# Patient Record
Sex: MALE | Race: OTHER | HISPANIC OR LATINO | Employment: FULL TIME | URBAN - METROPOLITAN AREA
[De-identification: names, ages, dates, MRNs, and addresses within clinical notes are randomized per-mention and may not be internally consistent; named-entity substitution may affect disease eponyms.]

---

## 2024-09-06 ENCOUNTER — LAB VISIT (OUTPATIENT)
Dept: LAB | Facility: HOSPITAL | Age: 49
End: 2024-09-06
Attending: FAMILY MEDICINE
Payer: COMMERCIAL

## 2024-09-06 ENCOUNTER — OFFICE VISIT (OUTPATIENT)
Dept: INTERNAL MEDICINE | Facility: CLINIC | Age: 49
End: 2024-09-06
Payer: COMMERCIAL

## 2024-09-06 VITALS
OXYGEN SATURATION: 97 % | SYSTOLIC BLOOD PRESSURE: 118 MMHG | RESPIRATION RATE: 16 BRPM | TEMPERATURE: 97 F | BODY MASS INDEX: 30.02 KG/M2 | WEIGHT: 233.94 LBS | HEART RATE: 93 BPM | DIASTOLIC BLOOD PRESSURE: 82 MMHG | HEIGHT: 74 IN

## 2024-09-06 DIAGNOSIS — Z11.3 SCREEN FOR STD (SEXUALLY TRANSMITTED DISEASE): ICD-10-CM

## 2024-09-06 DIAGNOSIS — E11.9 TYPE 2 DIABETES MELLITUS WITHOUT COMPLICATION, WITHOUT LONG-TERM CURRENT USE OF INSULIN: ICD-10-CM

## 2024-09-06 DIAGNOSIS — I25.2 HISTORY OF HEART ATTACK: ICD-10-CM

## 2024-09-06 DIAGNOSIS — Z12.5 SPECIAL SCREENING, PROSTATE CANCER: ICD-10-CM

## 2024-09-06 DIAGNOSIS — E78.5 HYPERLIPIDEMIA, UNSPECIFIED HYPERLIPIDEMIA TYPE: ICD-10-CM

## 2024-09-06 DIAGNOSIS — I10 PRIMARY HYPERTENSION: Primary | Chronic | ICD-10-CM

## 2024-09-06 DIAGNOSIS — E78.2 MIXED HYPERLIPIDEMIA: Chronic | ICD-10-CM

## 2024-09-06 DIAGNOSIS — I10 PRIMARY HYPERTENSION: ICD-10-CM

## 2024-09-06 DIAGNOSIS — E11.9 TYPE 2 DIABETES MELLITUS WITHOUT COMPLICATION, WITHOUT LONG-TERM CURRENT USE OF INSULIN: Chronic | ICD-10-CM

## 2024-09-06 DIAGNOSIS — I25.2 HISTORY OF HEART ATTACK: Chronic | ICD-10-CM

## 2024-09-06 DIAGNOSIS — D72.819 LEUKOPENIA, UNSPECIFIED TYPE: ICD-10-CM

## 2024-09-06 LAB
ABO + RH BLD: NORMAL
ALBUMIN SERPL BCP-MCNC: 4.1 G/DL (ref 3.5–5.2)
ALBUMIN/CREAT UR: NORMAL UG/MG (ref 0–30)
ALP SERPL-CCNC: 80 U/L (ref 55–135)
ALT SERPL W/O P-5'-P-CCNC: 19 U/L (ref 10–44)
ANION GAP SERPL CALC-SCNC: 9 MMOL/L (ref 8–16)
ANISOCYTOSIS BLD QL SMEAR: SLIGHT
AST SERPL-CCNC: 22 U/L (ref 10–40)
BASOPHILS # BLD AUTO: 0.06 K/UL (ref 0–0.2)
BASOPHILS NFR BLD: 2.1 % (ref 0–1.9)
BILIRUB SERPL-MCNC: 0.5 MG/DL (ref 0.1–1)
BUN SERPL-MCNC: 14 MG/DL (ref 6–20)
CALCIUM SERPL-MCNC: 9.5 MG/DL (ref 8.7–10.5)
CHLORIDE SERPL-SCNC: 105 MMOL/L (ref 95–110)
CHOLEST SERPL-MCNC: 221 MG/DL (ref 120–199)
CHOLEST/HDLC SERPL: 4.6 {RATIO} (ref 2–5)
CO2 SERPL-SCNC: 26 MMOL/L (ref 23–29)
COMPLEXED PSA SERPL-MCNC: 0.57 NG/ML (ref 0–4)
CREAT SERPL-MCNC: 0.9 MG/DL (ref 0.5–1.4)
CREAT UR-MCNC: 31 MG/DL (ref 23–375)
DIFFERENTIAL METHOD BLD: ABNORMAL
EOSINOPHIL # BLD AUTO: 0.1 K/UL (ref 0–0.5)
EOSINOPHIL NFR BLD: 3.9 % (ref 0–8)
ERYTHROCYTE [DISTWIDTH] IN BLOOD BY AUTOMATED COUNT: 13 % (ref 11.5–14.5)
EST. GFR  (NO RACE VARIABLE): >60 ML/MIN/1.73 M^2
ESTIMATED AVG GLUCOSE: 111 MG/DL (ref 68–131)
GLUCOSE SERPL-MCNC: 98 MG/DL (ref 70–110)
HBA1C MFR BLD: 5.5 % (ref 4–5.6)
HCT VFR BLD AUTO: 50.9 % (ref 40–54)
HCV AB SERPL QL IA: NORMAL
HDLC SERPL-MCNC: 48 MG/DL (ref 40–75)
HDLC SERPL: 21.7 % (ref 20–50)
HGB BLD-MCNC: 16.5 G/DL (ref 14–18)
HIV 1+2 AB+HIV1 P24 AG SERPL QL IA: NORMAL
HYPOCHROMIA BLD QL SMEAR: ABNORMAL
IMM GRANULOCYTES # BLD AUTO: 0 K/UL (ref 0–0.04)
IMM GRANULOCYTES NFR BLD AUTO: 0 % (ref 0–0.5)
LDLC SERPL CALC-MCNC: 143.6 MG/DL (ref 63–159)
LYMPHOCYTES # BLD AUTO: 1.4 K/UL (ref 1–4.8)
LYMPHOCYTES NFR BLD: 50.7 % (ref 18–48)
MCH RBC QN AUTO: 31.1 PG (ref 27–31)
MCHC RBC AUTO-ENTMCNC: 32.4 G/DL (ref 32–36)
MCV RBC AUTO: 96 FL (ref 82–98)
MICROALBUMIN UR DL<=1MG/L-MCNC: <5 UG/ML
MONOCYTES # BLD AUTO: 0.3 K/UL (ref 0.3–1)
MONOCYTES NFR BLD: 10.7 % (ref 4–15)
NEUTROPHILS # BLD AUTO: 0.9 K/UL (ref 1.8–7.7)
NEUTROPHILS NFR BLD: 32.6 % (ref 38–73)
NONHDLC SERPL-MCNC: 173 MG/DL
NRBC BLD-RTO: 0 /100 WBC
OVALOCYTES BLD QL SMEAR: ABNORMAL
PLATELET # BLD AUTO: 203 K/UL (ref 150–450)
PMV BLD AUTO: 10.8 FL (ref 9.2–12.9)
POIKILOCYTOSIS BLD QL SMEAR: SLIGHT
POLYCHROMASIA BLD QL SMEAR: ABNORMAL
POTASSIUM SERPL-SCNC: 4.2 MMOL/L (ref 3.5–5.1)
PROT SERPL-MCNC: 7.6 G/DL (ref 6–8.4)
RBC # BLD AUTO: 5.31 M/UL (ref 4.6–6.2)
SODIUM SERPL-SCNC: 140 MMOL/L (ref 136–145)
T4 FREE SERPL-MCNC: 1 NG/DL (ref 0.71–1.51)
TESTOST SERPL-MCNC: 634 NG/DL (ref 304–1227)
TRIGL SERPL-MCNC: 147 MG/DL (ref 30–150)
TROPONIN I SERPL DL<=0.01 NG/ML-MCNC: 0.01 NG/ML (ref 0–0.03)
TSH SERPL DL<=0.005 MIU/L-ACNC: 1.38 UIU/ML (ref 0.4–4)
WBC # BLD AUTO: 2.8 K/UL (ref 3.9–12.7)

## 2024-09-06 PROCEDURE — 3066F NEPHROPATHY DOC TX: CPT | Mod: CPTII,S$GLB,, | Performed by: FAMILY MEDICINE

## 2024-09-06 PROCEDURE — 83695 ASSAY OF LIPOPROTEIN(A): CPT | Performed by: FAMILY MEDICINE

## 2024-09-06 PROCEDURE — 36415 COLL VENOUS BLD VENIPUNCTURE: CPT | Mod: PO | Performed by: FAMILY MEDICINE

## 2024-09-06 PROCEDURE — 80053 COMPREHEN METABOLIC PANEL: CPT | Performed by: FAMILY MEDICINE

## 2024-09-06 PROCEDURE — 3074F SYST BP LT 130 MM HG: CPT | Mod: CPTII,S$GLB,, | Performed by: FAMILY MEDICINE

## 2024-09-06 PROCEDURE — 80061 LIPID PANEL: CPT | Performed by: FAMILY MEDICINE

## 2024-09-06 PROCEDURE — 87389 HIV-1 AG W/HIV-1&-2 AB AG IA: CPT | Performed by: FAMILY MEDICINE

## 2024-09-06 PROCEDURE — 99204 OFFICE O/P NEW MOD 45 MIN: CPT | Mod: S$GLB,,, | Performed by: FAMILY MEDICINE

## 2024-09-06 PROCEDURE — 83036 HEMOGLOBIN GLYCOSYLATED A1C: CPT | Performed by: FAMILY MEDICINE

## 2024-09-06 PROCEDURE — 84153 ASSAY OF PSA TOTAL: CPT | Performed by: FAMILY MEDICINE

## 2024-09-06 PROCEDURE — 3008F BODY MASS INDEX DOCD: CPT | Mod: CPTII,S$GLB,, | Performed by: FAMILY MEDICINE

## 2024-09-06 PROCEDURE — 3044F HG A1C LEVEL LT 7.0%: CPT | Mod: CPTII,S$GLB,, | Performed by: FAMILY MEDICINE

## 2024-09-06 PROCEDURE — 86900 BLOOD TYPING SEROLOGIC ABO: CPT | Performed by: FAMILY MEDICINE

## 2024-09-06 PROCEDURE — 85025 COMPLETE CBC W/AUTO DIFF WBC: CPT | Performed by: FAMILY MEDICINE

## 2024-09-06 PROCEDURE — 1159F MED LIST DOCD IN RCRD: CPT | Mod: CPTII,S$GLB,, | Performed by: FAMILY MEDICINE

## 2024-09-06 PROCEDURE — 82570 ASSAY OF URINE CREATININE: CPT | Performed by: FAMILY MEDICINE

## 2024-09-06 PROCEDURE — 84439 ASSAY OF FREE THYROXINE: CPT | Performed by: FAMILY MEDICINE

## 2024-09-06 PROCEDURE — 86901 BLOOD TYPING SEROLOGIC RH(D): CPT | Performed by: FAMILY MEDICINE

## 2024-09-06 PROCEDURE — 3061F NEG MICROALBUMINURIA REV: CPT | Mod: CPTII,S$GLB,, | Performed by: FAMILY MEDICINE

## 2024-09-06 PROCEDURE — 84443 ASSAY THYROID STIM HORMONE: CPT | Performed by: FAMILY MEDICINE

## 2024-09-06 PROCEDURE — 3079F DIAST BP 80-89 MM HG: CPT | Mod: CPTII,S$GLB,, | Performed by: FAMILY MEDICINE

## 2024-09-06 PROCEDURE — 84403 ASSAY OF TOTAL TESTOSTERONE: CPT | Performed by: FAMILY MEDICINE

## 2024-09-06 PROCEDURE — 86803 HEPATITIS C AB TEST: CPT | Performed by: FAMILY MEDICINE

## 2024-09-06 PROCEDURE — 1160F RVW MEDS BY RX/DR IN RCRD: CPT | Mod: CPTII,S$GLB,, | Performed by: FAMILY MEDICINE

## 2024-09-06 PROCEDURE — 99999 PR PBB SHADOW E&M-NEW PATIENT-LVL V: CPT | Mod: PBBFAC,,, | Performed by: FAMILY MEDICINE

## 2024-09-06 PROCEDURE — 84484 ASSAY OF TROPONIN QUANT: CPT | Performed by: FAMILY MEDICINE

## 2024-09-06 PROCEDURE — 82043 UR ALBUMIN QUANTITATIVE: CPT | Performed by: FAMILY MEDICINE

## 2024-09-06 RX ORDER — EMPAGLIFLOZIN 25 MG/1
25 TABLET, FILM COATED ORAL DAILY
COMMUNITY
Start: 2024-08-13

## 2024-09-06 RX ORDER — METFORMIN HYDROCHLORIDE 500 MG/1
500 TABLET ORAL NIGHTLY
COMMUNITY

## 2024-09-06 RX ORDER — CLOPIDOGREL BISULFATE 75 MG/1
75 TABLET ORAL DAILY
COMMUNITY
Start: 2024-05-23

## 2024-09-06 RX ORDER — CETIRIZINE HYDROCHLORIDE 10 MG/1
10 TABLET ORAL DAILY
COMMUNITY

## 2024-09-06 RX ORDER — ROSUVASTATIN CALCIUM 40 MG/1
40 TABLET, COATED ORAL DAILY
COMMUNITY

## 2024-09-06 NOTE — PROGRESS NOTES
Subjective     Patient ID: Weston Morgan is a 48 y.o. male.    Chief Complaint: Establish Care    48 year old male who presents today to [facility]. He reports currently having hypertension, hyperlipidemia, and Type 2 DM. He states he had a mild heart attack last year and is s/p heart cath with minimal findings. He has been placed on conservative therapy with plavix by his cardiologist. 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 Because he missed his 3 month appointment at home he scheduled to be seen today and is requesting a cardiology referral as well. Patient states since the heart attack he is very conscientious about his diet and is physically active. He keeps his three month appointments and self- monitors his A1c and blood pressure at home. He reports some left mid upper back pain that he was originally concerned it was his kidneys but after massaging it improved and he thinks it is just from his work outs.       Review of Systems   Constitutional: Negative.    HENT: Negative.     Eyes: Negative.    Respiratory: Negative.     Cardiovascular: Negative.    Gastrointestinal: Negative.    Musculoskeletal: Negative.    Neurological: Negative.    Psychiatric/Behavioral: Negative.            Objective     Physical Exam  Vitals and nursing note reviewed.   Constitutional:       Appearance: Normal appearance.   HENT:      Head: Normocephalic and atraumatic.      Right Ear: Tympanic membrane, ear canal and external ear normal.      Left Ear: Tympanic membrane, ear canal and external ear normal.      Nose: Nose normal.      Mouth/Throat:      Mouth: Mucous membranes are moist.      Pharynx: Oropharynx is clear.   Eyes:      Extraocular Movements: Extraocular movements intact.      Conjunctiva/sclera: Conjunctivae normal.   Cardiovascular:      Rate and Rhythm: Normal rate and regular rhythm.   Pulmonary:      Effort: Pulmonary effort is normal.      Breath sounds: Normal  breath sounds.   Abdominal:      General: Abdomen is flat. Bowel sounds are normal.      Palpations: Abdomen is soft.   Musculoskeletal:      Cervical back: Normal range of motion and neck supple.      Right lower leg: No edema.      Left lower leg: No edema.   Lymphadenopathy:      Cervical: No cervical adenopathy.   Skin:     General: Skin is warm and dry.   Neurological:      General: No focal deficit present.      Mental Status: He is alert and oriented to person, place, and time.   Psychiatric:         Mood and Affect: Mood normal.         Behavior: Behavior normal.            Assessment and Plan     1. History of heart attack  -     Ambulatory referral/consult to Cardiology; Future; Expected date: 09/13/2024  -     TESTOSTERONE; Future; Expected date: 09/06/2024  -     TROPONIN I; Future; Expected date: 09/06/2024  -     GROUP & RH; Future; Expected date: 09/06/2024  -     LIPOPROTEIN A (LPA); Future; Expected date: 09/06/2024    2. Primary hypertension  -     T4, Free; Future; Expected date: 09/06/2024  -     TSH; Future; Expected date: 09/06/2024  -     Comprehensive Metabolic Panel; Future; Expected date: 09/06/2024  -     CBC Auto Differential; Future; Expected date: 09/06/2024    3. Type 2 diabetes mellitus without complication, without long-term current use of insulin  -     Hemoglobin A1C; Future; Expected date: 09/06/2024  -     Microalbumin/creatinine urine ratio    4. Hyperlipidemia, unspecified hyperlipidemia type  -     Lipid Panel; Future; Expected date: 09/06/2024    5. Screen for STD (sexually transmitted disease)  -     HIV 1/2 Ag/Ab (4th Gen); Future; Expected date: 09/06/2024  -     HEPATITIS C ANTIBODY; Future; Expected date: 09/06/2024    6. Special screening, prostate cancer  -     PSA, SCREENING; Future; Expected date: 09/06/2024                 Follow up in about 3 months (around 12/6/2024).       rosuvastatin  Overview:  stable cont rosuvastatin    Orders:  -     Lipid Panel; Future; Expected date: 09/06/2024    5. Screen for STD (sexually transmitted disease)  -     HIV 1/2 Ag/Ab (4th Gen); Future; Expected date: 09/06/2024  -     HEPATITIS C ANTIBODY; Future; Expected date: 09/06/2024    6. Special screening, prostate cancer  -     PSA, SCREENING; Future; Expected date: 09/06/2024                 Follow up in about 3 months (around 12/6/2024).

## 2024-09-09 LAB — LPA SERPL-MCNC: 5 MG/DL (ref 0–30)

## 2024-09-16 ENCOUNTER — TELEPHONE (OUTPATIENT)
Dept: INTERNAL MEDICINE | Facility: CLINIC | Age: 49
End: 2024-09-16
Payer: COMMERCIAL

## 2024-09-16 PROBLEM — E11.9 TYPE 2 DIABETES MELLITUS WITHOUT COMPLICATION, WITHOUT LONG-TERM CURRENT USE OF INSULIN: Chronic | Status: ACTIVE | Noted: 2024-09-16

## 2024-09-16 PROBLEM — I25.2 HISTORY OF HEART ATTACK: Chronic | Status: ACTIVE | Noted: 2024-09-16

## 2024-09-16 PROBLEM — I10 PRIMARY HYPERTENSION: Chronic | Status: ACTIVE | Noted: 2024-09-16

## 2024-09-16 PROBLEM — E78.2 MIXED HYPERLIPIDEMIA: Chronic | Status: ACTIVE | Noted: 2024-09-16

## 2024-09-20 ENCOUNTER — LAB VISIT (OUTPATIENT)
Dept: LAB | Facility: HOSPITAL | Age: 49
End: 2024-09-20
Attending: FAMILY MEDICINE
Payer: COMMERCIAL

## 2024-09-20 DIAGNOSIS — D72.819 LEUKOPENIA, UNSPECIFIED TYPE: ICD-10-CM

## 2024-09-20 LAB
BASOPHILS # BLD AUTO: 0.05 K/UL (ref 0–0.2)
BASOPHILS NFR BLD: 1.7 % (ref 0–1.9)
CRP SERPL-MCNC: 1.8 MG/L (ref 0–8.2)
DIFFERENTIAL METHOD BLD: ABNORMAL
EOSINOPHIL # BLD AUTO: 0.2 K/UL (ref 0–0.5)
EOSINOPHIL NFR BLD: 5.4 % (ref 0–8)
ERYTHROCYTE [DISTWIDTH] IN BLOOD BY AUTOMATED COUNT: 13.1 % (ref 11.5–14.5)
ERYTHROCYTE [SEDIMENTATION RATE] IN BLOOD BY PHOTOMETRIC METHOD: <2 MM/HR (ref 0–23)
HCT VFR BLD AUTO: 49.1 % (ref 40–54)
HGB BLD-MCNC: 15.8 G/DL (ref 14–18)
IMM GRANULOCYTES # BLD AUTO: 0.01 K/UL (ref 0–0.04)
IMM GRANULOCYTES NFR BLD AUTO: 0.3 % (ref 0–0.5)
LYMPHOCYTES # BLD AUTO: 1.7 K/UL (ref 1–4.8)
LYMPHOCYTES NFR BLD: 56.7 % (ref 18–48)
MCH RBC QN AUTO: 30.9 PG (ref 27–31)
MCHC RBC AUTO-ENTMCNC: 32.2 G/DL (ref 32–36)
MCV RBC AUTO: 96 FL (ref 82–98)
MONOCYTES # BLD AUTO: 0.4 K/UL (ref 0.3–1)
MONOCYTES NFR BLD: 12.8 % (ref 4–15)
NEUTROPHILS # BLD AUTO: 0.7 K/UL (ref 1.8–7.7)
NEUTROPHILS NFR BLD: 23.1 % (ref 38–73)
NRBC BLD-RTO: 0 /100 WBC
PLATELET # BLD AUTO: 204 K/UL (ref 150–450)
PMV BLD AUTO: 10.6 FL (ref 9.2–12.9)
RBC # BLD AUTO: 5.11 M/UL (ref 4.6–6.2)
WBC # BLD AUTO: 2.98 K/UL (ref 3.9–12.7)

## 2024-09-20 PROCEDURE — 86140 C-REACTIVE PROTEIN: CPT | Performed by: FAMILY MEDICINE

## 2024-09-20 PROCEDURE — 85025 COMPLETE CBC W/AUTO DIFF WBC: CPT | Performed by: FAMILY MEDICINE

## 2024-09-20 PROCEDURE — 85652 RBC SED RATE AUTOMATED: CPT | Performed by: FAMILY MEDICINE

## 2024-09-20 PROCEDURE — 36415 COLL VENOUS BLD VENIPUNCTURE: CPT | Mod: PO | Performed by: FAMILY MEDICINE

## 2024-09-25 ENCOUNTER — OFFICE VISIT (OUTPATIENT)
Dept: INTERNAL MEDICINE | Facility: CLINIC | Age: 49
End: 2024-09-25
Payer: COMMERCIAL

## 2024-09-25 DIAGNOSIS — D72.819 LEUKOPENIA, UNSPECIFIED TYPE: Primary | ICD-10-CM

## 2024-09-25 PROCEDURE — 1160F RVW MEDS BY RX/DR IN RCRD: CPT | Mod: CPTII,95,, | Performed by: FAMILY MEDICINE

## 2024-09-25 PROCEDURE — 3061F NEG MICROALBUMINURIA REV: CPT | Mod: CPTII,95,, | Performed by: FAMILY MEDICINE

## 2024-09-25 PROCEDURE — 3044F HG A1C LEVEL LT 7.0%: CPT | Mod: CPTII,95,, | Performed by: FAMILY MEDICINE

## 2024-09-25 PROCEDURE — 1159F MED LIST DOCD IN RCRD: CPT | Mod: CPTII,95,, | Performed by: FAMILY MEDICINE

## 2024-09-25 PROCEDURE — 3066F NEPHROPATHY DOC TX: CPT | Mod: CPTII,95,, | Performed by: FAMILY MEDICINE

## 2024-09-25 PROCEDURE — 99213 OFFICE O/P EST LOW 20 MIN: CPT | Mod: 95,,, | Performed by: FAMILY MEDICINE

## 2024-09-25 NOTE — PROGRESS NOTES
Subjective     Patient ID: Weston Morgan is a 48 y.o. male.        The patient location is: work  The chief complaint leading to consultation is: lab review    Visit type: audiovisual    Face to Face time with patient: 5 min  15 minutes of total time spent on the encounter, which includes face to face time and non-face to face time preparing to see the patient (eg, review of tests), Obtaining and/or reviewing separately obtained history, Documenting clinical information in the electronic or other health record, Independently interpreting results (not separately reported) and communicating results to the patient/family/caregiver, or Care coordination (not separately reported).         Each patient to whom he or she provides medical services by telemedicine is:  (1) informed of the relationship between the physician and patient and the respective role of any other health care provider with respect to management of the patient; and (2) notified that he or she may decline to receive medical services by telemedicine and may withdraw from such care at any time.    Notes:     History of Present Illness      HEMATOLOGIC CONCERNS:  He reports no prior history of low white blood cell count. Initial lab result showed a white blood cell count of 2.8, which was approximately one point below the normal range. A repeat test demonstrated a slight increase, but the count remains low. He has forwarded lab results to his cardiologist and is awaiting a response.    MEDICATIONS:  He inquires about the prescription refill process.      ROS:  General: -fever, -chills, -fatigue, -weight gain, -weight loss  Eyes: -vision changes, -redness, -discharge  ENT: -ear pain, -nasal congestion, -sore throat  Cardiovascular: -chest pain, -palpitations, -lower extremity edema  Respiratory: -cough, -shortness of breath  Gastrointestinal: -abdominal pain, -nausea, -vomiting, -diarrhea, -constipation, -blood in stool  Genitourinary: -dysuria,  -hematuria, -frequency  Musculoskeletal: -joint pain, -muscle pain  Skin: -rash, -lesion  Neurological: -headache, -dizziness, -numbness, -tingling, -weakness  Psychiatric: -anxiety, -depression, -sleep difficulty            Objective     Physical Exam  Constitutional:       Appearance: Normal appearance.   HENT:      Head: Normocephalic and atraumatic.   Eyes:      Extraocular Movements: Extraocular movements intact.   Pulmonary:      Effort: Pulmonary effort is normal.   Neurological:      General: No focal deficit present.      Mental Status: He is alert.   Psychiatric:         Mood and Affect: Mood normal.              Assessment and Plan     1. Leukopenia, unspecified type  -     Ambulatory referral/consult to Hematology / Oncology; Future; Expected date: 10/02/2024        Assessment & Plan    Evaluated low white blood cell count (2.8, slightly increased on repeat but still low)  No prior history of low white blood cell count reported by patient  Considered benign etiology but unable to rule out other causes due to lack of prolonged history  Ruled out medication-induced low white blood cell count based on current medication list  Will refer to hematology for further evaluation and additional testing    PATIENT EDUCATION:   Explained that hematologists are blood specialists who can evaluate various causes of low white blood cell count   Discussed that low white blood cell count can be due to natural variation, other issues, disease states, stressors, illness, or medications   Explained that additional testing may be required by hematology    REFERRALS:   Referred to hematology for evaluation of low white blood cell count    FOLLOW UP:   Follow up after hematology appointment   Contact the office to send medication refill requests              Follow up if symptoms worsen or fail to improve.    This note was generated with the assistance of ambient listening technology. Verbal consent was obtained by the patient  and accompanying visitor(s) for the recording of patient appointment to facilitate this note. I attest to having reviewed and edited the generated note for accuracy, though some syntax or spelling errors may persist. Please contact the author of this note for any clarification.

## 2024-10-07 ENCOUNTER — PATIENT MESSAGE (OUTPATIENT)
Dept: INTERNAL MEDICINE | Facility: CLINIC | Age: 49
End: 2024-10-07
Payer: COMMERCIAL

## 2024-10-07 NOTE — TELEPHONE ENCOUNTER
Pt has a referral to Hemonc but has not been contacted per pt.  Is there something that is preventing him being scheduled?

## 2024-10-08 ENCOUNTER — TELEPHONE (OUTPATIENT)
Dept: HEMATOLOGY/ONCOLOGY | Facility: CLINIC | Age: 49
End: 2024-10-08
Payer: COMMERCIAL

## 2024-10-08 DIAGNOSIS — D72.819 LEUKOPENIA: Primary | ICD-10-CM

## 2024-10-08 NOTE — TELEPHONE ENCOUNTER
Spoke to patient in reference to Hematology referral from Dr. Jacobs. Appointment scheduled per patient's request next available in [facility] or [place]. Appointment notice via pt portal.

## 2024-10-31 ENCOUNTER — LAB VISIT (OUTPATIENT)
Dept: LAB | Facility: HOSPITAL | Age: 49
End: 2024-10-31
Attending: NURSE PRACTITIONER
Payer: COMMERCIAL

## 2024-10-31 DIAGNOSIS — D72.819 LEUKOPENIA: ICD-10-CM

## 2024-10-31 LAB
BASOPHILS # BLD AUTO: 0.07 K/UL (ref 0–0.2)
BASOPHILS NFR BLD: 2.4 % (ref 0–1.9)
DIFFERENTIAL METHOD BLD: ABNORMAL
EOSINOPHIL # BLD AUTO: 0.3 K/UL (ref 0–0.5)
EOSINOPHIL NFR BLD: 8.9 % (ref 0–8)
ERYTHROCYTE [DISTWIDTH] IN BLOOD BY AUTOMATED COUNT: 13.4 % (ref 11.5–14.5)
FOLATE SERPL-MCNC: 17.8 NG/ML (ref 4–24)
GIANT PLATELETS BLD QL SMEAR: PRESENT
HBV SURFACE AG SERPL QL IA: NORMAL
HCT VFR BLD AUTO: 48.5 % (ref 40–54)
HGB BLD-MCNC: 16.2 G/DL (ref 14–18)
IMM GRANULOCYTES # BLD AUTO: 0.01 K/UL (ref 0–0.04)
IMM GRANULOCYTES NFR BLD AUTO: 0.3 % (ref 0–0.5)
LYMPHOCYTES # BLD AUTO: 1.4 K/UL (ref 1–4.8)
LYMPHOCYTES NFR BLD: 47.4 % (ref 18–48)
MCH RBC QN AUTO: 31.2 PG (ref 27–31)
MCHC RBC AUTO-ENTMCNC: 33.4 G/DL (ref 32–36)
MCV RBC AUTO: 93 FL (ref 82–98)
MONOCYTES # BLD AUTO: 0.4 K/UL (ref 0.3–1)
MONOCYTES NFR BLD: 11.9 % (ref 4–15)
NEUTROPHILS # BLD AUTO: 0.9 K/UL (ref 1.8–7.7)
NEUTROPHILS NFR BLD: 29.1 % (ref 38–73)
NRBC BLD-RTO: 0 /100 WBC
PLATELET # BLD AUTO: 209 K/UL (ref 150–450)
PLATELET BLD QL SMEAR: ABNORMAL
PMV BLD AUTO: 10.4 FL (ref 9.2–12.9)
RBC # BLD AUTO: 5.19 M/UL (ref 4.6–6.2)
VIT B12 SERPL-MCNC: 883 PG/ML (ref 210–950)
WBC # BLD AUTO: 2.93 K/UL (ref 3.9–12.7)

## 2024-10-31 PROCEDURE — 82607 VITAMIN B-12: CPT | Performed by: NURSE PRACTITIONER

## 2024-10-31 PROCEDURE — 85025 COMPLETE CBC W/AUTO DIFF WBC: CPT | Performed by: NURSE PRACTITIONER

## 2024-10-31 PROCEDURE — 87340 HEPATITIS B SURFACE AG IA: CPT | Performed by: NURSE PRACTITIONER

## 2024-10-31 PROCEDURE — 86038 ANTINUCLEAR ANTIBODIES: CPT | Performed by: NURSE PRACTITIONER

## 2024-10-31 PROCEDURE — 36415 COLL VENOUS BLD VENIPUNCTURE: CPT | Mod: PN | Performed by: NURSE PRACTITIONER

## 2024-10-31 PROCEDURE — 82746 ASSAY OF FOLIC ACID SERUM: CPT | Performed by: NURSE PRACTITIONER

## 2024-11-01 LAB — ANA SER QL IF: NORMAL

## 2024-11-07 ENCOUNTER — PATIENT MESSAGE (OUTPATIENT)
Dept: HEMATOLOGY/ONCOLOGY | Facility: CLINIC | Age: 49
End: 2024-11-07

## 2024-11-07 ENCOUNTER — OFFICE VISIT (OUTPATIENT)
Dept: HEMATOLOGY/ONCOLOGY | Facility: CLINIC | Age: 49
End: 2024-11-07
Payer: COMMERCIAL

## 2024-11-07 DIAGNOSIS — D70.9 NEUTROPENIA, UNSPECIFIED TYPE: Primary | ICD-10-CM

## 2024-11-07 DIAGNOSIS — D72.819 LEUKOPENIA, UNSPECIFIED TYPE: ICD-10-CM

## 2024-11-07 NOTE — PROGRESS NOTES
The patient location is: home  The chief complaint leading to consultation is: no complaints    Visit type: audiovisual    Face to Face time with patient: 35  60 minutes of total time spent on the encounter, which includes face to face time and non-face to face time preparing to see the patient (eg, review of tests), Obtaining and/or reviewing separately obtained history, Documenting clinical information in the electronic or other health record, Independently interpreting results (not separately reported) and communicating results to the patient/family/caregiver, or Care coordination (not separately reported).     Each patient to whom he or she provides medical services by telemedicine is:  (1) informed of the relationship between the physician and patient and the respective role of any other health care provider with respect to management of the patient; and (2) notified that he or she may decline to receive medical services by telemedicine and may withdraw from such care at any time.      Patient ID: Weston Morgan is a 49 y.o. male.    Chief Complaint: no coplaints    HPI:  50 yo male presents to the clinic as a new patient for further evaluation and recommendation.  Consulted for leukopenia.      Noted outside labs in care everywhere. 6/12/2023 wbc normal low ANC 1.19.  He state on 6/10/2024 [facility] [place] [facility] for what was considered a mild heart attack.  Starting in 9/2024 with noted low WBC and ANC ranging from 0.7-0.9.      Denies frequent infections requiring abx.  Denies f/c/ns. Denies any unintentional weight loss.  He has had intentional weight loss.  Denies any known abnormal lymphadenopathy.    States that he is going through a divorce right now so has increased stress.     States that he feels perfect and works out every night.    Denies illicit drug use or cigarette smoking.  Drinks a 6 pack of beer on Saturdays.    Family hx of cancer:  No known family hx  of cancer    Review of medications without adverse reaction including neutropenia    I have reviewed all of the patient's relevant lab work available in the medical record and have utilized this in my evaluation and management recommendations today.      Social History     Socioeconomic History    Marital status: Legally     Number of children: 2   Tobacco Use    Smoking status: Never     Passive exposure: Never    Smokeless tobacco: Never   Substance and Sexual Activity    Alcohol use: Yes     Comment: Rarely    Drug use: Never    Sexual activity: Yes     Partners: Female     Social Drivers of Health     Financial Resource Strain: Low Risk  (9/25/2024)    Overall Financial Resource Strain (CARDIA)     Difficulty of Paying Living Expenses: Not hard at all   Food Insecurity: Patient Declined (9/25/2024)    Hunger Vital Sign     Worried About Running Out of Food in the Last Year: Patient declined     Ran Out of Food in the Last Year: Patient declined   Transportation Needs: Unknown (6/10/2023)    Received from [facility]    PRAPARE - Transportation     Lack of Transportation (Medical): No   Physical Activity: Unknown (9/25/2024)    Exercise Vital Sign     Days of Exercise per Week: 2 days   Stress: No Stress Concern Present (9/25/2024)    [employer] - Occupational Stress Questionnaire     Feeling of Stress : Only a little   Housing Stability: Unknown (9/25/2024)    Housing Stability Vital Sign     Unable to Pay for Housing in the Last Year: Patient declined       Family History   Problem Relation Name Age of Onset    Heart disease Mother Julianne     Hyperlipidemia Mother Julianne     Hypertension Mother Julianne     Diabetes Father Carlos        No past surgical history on file.    Past Medical History:   Diagnosis Date    Diabetes mellitus, type 2     Heart attack 06/10/2023    Mild    Hyperlipidemia     Hypertension        Review of Systems   Constitutional:   Negative for appetite change and unexpected weight change.   HENT:  Negative for mouth sores.    Eyes:  Negative for visual disturbance.   Respiratory:  Negative for cough and shortness of breath.    Cardiovascular:  Negative for chest pain.   Gastrointestinal:  Negative for abdominal pain and diarrhea.   Genitourinary:  Negative for frequency.   Musculoskeletal:  Negative for back pain.   Skin:  Negative for rash.   Neurological:  Negative for headaches.   Hematological:  Negative for adenopathy.   Psychiatric/Behavioral:  The patient is not nervous/anxious.           Medication List with Changes/Refills   Current Medications    CETIRIZINE (ZYRTEC) 10 MG TABLET    Take 10 mg by mouth once daily.    CLOPIDOGREL (PLAVIX) 75 MG TABLET    Take 75 mg by mouth once daily.    JARDIANCE 25 MG TABLET    Take 25 mg by mouth once daily.    METFORMIN (GLUCOPHAGE) 500 MG TABLET    Take 500 mg by mouth nightly.    ROSUVASTATIN (CRESTOR) 40 MG TAB    Take 40 mg by mouth once daily.        Objective:   There were no vitals filed for this visit.    Physical Exam  Constitutional:       Appearance: Normal appearance.   Pulmonary:      Effort: Pulmonary effort is normal.   Neurological:      Mental Status: He is alert and oriented to person, place, and time.   Psychiatric:         Mood and Affect: Mood normal.         Behavior: Behavior normal.         Thought Content: Thought content normal.         Judgment: Judgment normal.         Assessment:     Problem List Items Addressed This Visit    None  Visit Diagnoses       Neutropenia, unspecified type    -  Primary    Leukopenia, unspecified type                Lab Results   Component Value Date    WBC 2.93 (L) 10/31/2024    RBC 5.19 10/31/2024    HGB 16.2 10/31/2024    HCT 48.5 10/31/2024    MCV 93 10/31/2024    MCH 31.2 (H) 10/31/2024    MCHC 33.4 10/31/2024    RDW 13.4 10/31/2024     10/31/2024    MPV 10.4 10/31/2024    GRAN 0.9 (L) 10/31/2024    GRAN 29.1 (L) 10/31/2024     "LYMPH 1.4 10/31/2024    LYMPH 47.4 10/31/2024    MONO 0.4 10/31/2024    MONO 11.9 10/31/2024    EOS 0.3 10/31/2024    BASO 0.07 10/31/2024    EOSINOPHIL 8.9 (H) 10/31/2024    BASOPHIL 2.4 (H) 10/31/2024      Lab Results   Component Value Date     09/06/2024    K 4.2 09/06/2024     09/06/2024    CO2 26 09/06/2024    BUN 14 09/06/2024    CREATININE 0.9 09/06/2024    CALCIUM 9.5 09/06/2024    ANIONGAP 9 09/06/2024     Lab Results   Component Value Date    ALT 19 09/06/2024    AST 22 09/06/2024    ALKPHOS 80 09/06/2024    BILITOT 0.5 09/06/2024     No results found for: "IRON", "TRANSFERRIN", "TIBC", "FESATURATED", "FERRITIN"     Plan:   Neutropenia, unspecified type    Leukopenia, unspecified type  -     Ambulatory referral/consult to Hematology / Oncology    Workup for leukopenia/neutropenia unremarkable. Will need to evaluate peripheral smear.  Test ordered.   Will reach out to heme path to determine status of evaluation.  Discuss evaluation with bone marrow biopsy vs watching with labs for worsening neutropenia or development of additional cytopenias.    Update: unable to obtain peripheral smear results.  Will continue to trend cbc's over a period of 4 months and will f/u in 4 months to discuss results - possible bone marrow biopsy pending results.     Med Onc Chart Routing      Follow up with physician    Follow up with SHARON 4 months. lab prior - VV after to discuss   Infusion scheduling note   n/a   Injection scheduling note n/a   Labs   Scheduling:  Preferred lab: [facility]  Lab interval:  Standing monthly CBC's x 4 with f/u after the 4th cbc to discuss results   Imaging   N/a   Pharmacy appointment No pharmacy appointment needed      Other referrals       No additional referrals needed  n/a              Collaborating Provider:  Dr. Stu Augustin    Thank You,  Vinh Natarajan, FNP-C  Benign Hematology    "

## 2024-11-07 NOTE — PROGRESS NOTES
Caryl Mullen,  A peripheral smear was ordered on this patient with neutropenia.  I have not received the results.  Can you please check the status?  If this does not pertain to you can you please let me know who I would need to reach out to?    Thank you,   Vinh Natarajan - MARCOS -C  [facility]

## 2025-01-06 ENCOUNTER — LAB VISIT (OUTPATIENT)
Dept: LAB | Facility: HOSPITAL | Age: 50
End: 2025-01-06
Attending: FAMILY MEDICINE
Payer: COMMERCIAL

## 2025-01-06 ENCOUNTER — OFFICE VISIT (OUTPATIENT)
Dept: INTERNAL MEDICINE | Facility: CLINIC | Age: 50
End: 2025-01-06
Payer: COMMERCIAL

## 2025-01-06 VITALS
HEART RATE: 72 BPM | DIASTOLIC BLOOD PRESSURE: 80 MMHG | WEIGHT: 231.06 LBS | BODY MASS INDEX: 29.65 KG/M2 | TEMPERATURE: 96 F | SYSTOLIC BLOOD PRESSURE: 118 MMHG | HEIGHT: 74 IN | OXYGEN SATURATION: 97 %

## 2025-01-06 DIAGNOSIS — I25.2 HISTORY OF HEART ATTACK: Chronic | ICD-10-CM

## 2025-01-06 DIAGNOSIS — I10 PRIMARY HYPERTENSION: ICD-10-CM

## 2025-01-06 DIAGNOSIS — D72.819 LEUKOPENIA, UNSPECIFIED TYPE: ICD-10-CM

## 2025-01-06 DIAGNOSIS — D72.819 LEUKOPENIA, UNSPECIFIED TYPE: Chronic | ICD-10-CM

## 2025-01-06 DIAGNOSIS — Z13.6 ENCOUNTER FOR SCREENING FOR CARDIOVASCULAR DISORDERS: ICD-10-CM

## 2025-01-06 DIAGNOSIS — E11.9 TYPE 2 DIABETES MELLITUS WITHOUT COMPLICATION, WITHOUT LONG-TERM CURRENT USE OF INSULIN: Chronic | ICD-10-CM

## 2025-01-06 DIAGNOSIS — I10 PRIMARY HYPERTENSION: Primary | Chronic | ICD-10-CM

## 2025-01-06 DIAGNOSIS — E11.9 TYPE 2 DIABETES MELLITUS WITHOUT COMPLICATION, WITHOUT LONG-TERM CURRENT USE OF INSULIN: ICD-10-CM

## 2025-01-06 LAB
ALBUMIN SERPL BCP-MCNC: 4.1 G/DL (ref 3.5–5.2)
ALP SERPL-CCNC: 78 U/L (ref 40–150)
ALT SERPL W/O P-5'-P-CCNC: 20 U/L (ref 10–44)
ANION GAP SERPL CALC-SCNC: 10 MMOL/L (ref 8–16)
AST SERPL-CCNC: 27 U/L (ref 10–40)
BASOPHILS # BLD AUTO: ABNORMAL K/UL (ref 0–0.2)
BASOPHILS NFR BLD: 0 % (ref 0–1.9)
BILIRUB SERPL-MCNC: 0.4 MG/DL (ref 0.1–1)
BUN SERPL-MCNC: 17 MG/DL (ref 6–20)
CALCIUM SERPL-MCNC: 9.2 MG/DL (ref 8.7–10.5)
CHLORIDE SERPL-SCNC: 104 MMOL/L (ref 95–110)
CHOLEST SERPL-MCNC: 164 MG/DL (ref 120–199)
CHOLEST/HDLC SERPL: 3.3 {RATIO} (ref 2–5)
CO2 SERPL-SCNC: 26 MMOL/L (ref 23–29)
CREAT SERPL-MCNC: 0.8 MG/DL (ref 0.5–1.4)
DIFFERENTIAL METHOD BLD: ABNORMAL
EOSINOPHIL # BLD AUTO: ABNORMAL K/UL (ref 0–0.5)
EOSINOPHIL NFR BLD: 4 % (ref 0–8)
ERYTHROCYTE [DISTWIDTH] IN BLOOD BY AUTOMATED COUNT: 12.8 % (ref 11.5–14.5)
EST. GFR  (NO RACE VARIABLE): >60 ML/MIN/1.73 M^2
ESTIMATED AVG GLUCOSE: 103 MG/DL (ref 68–131)
GLUCOSE SERPL-MCNC: 80 MG/DL (ref 70–110)
HBA1C MFR BLD: 5.2 % (ref 4–5.6)
HCT VFR BLD AUTO: 44.9 % (ref 40–54)
HDLC SERPL-MCNC: 50 MG/DL (ref 40–75)
HDLC SERPL: 30.5 % (ref 20–50)
HGB BLD-MCNC: 15 G/DL (ref 14–18)
IMM GRANULOCYTES # BLD AUTO: ABNORMAL K/UL (ref 0–0.04)
IMM GRANULOCYTES NFR BLD AUTO: ABNORMAL % (ref 0–0.5)
LDLC SERPL CALC-MCNC: 101.4 MG/DL (ref 63–159)
LYMPHOCYTES # BLD AUTO: ABNORMAL K/UL (ref 1–4.8)
LYMPHOCYTES NFR BLD: 53 % (ref 18–48)
MCH RBC QN AUTO: 31.4 PG (ref 27–31)
MCHC RBC AUTO-ENTMCNC: 33.4 G/DL (ref 32–36)
MCV RBC AUTO: 94 FL (ref 82–98)
MONOCYTES # BLD AUTO: ABNORMAL K/UL (ref 0.3–1)
MONOCYTES NFR BLD: 8 % (ref 4–15)
NEUTROPHILS # BLD AUTO: ABNORMAL K/UL (ref 1.8–7.7)
NEUTROPHILS NFR BLD: 35 % (ref 38–73)
NONHDLC SERPL-MCNC: 114 MG/DL
NRBC BLD-RTO: 0 /100 WBC
PLATELET # BLD AUTO: 190 K/UL (ref 150–450)
PLATELET BLD QL SMEAR: ABNORMAL
PMV BLD AUTO: 10.5 FL (ref 9.2–12.9)
POTASSIUM SERPL-SCNC: 4.5 MMOL/L (ref 3.5–5.1)
PROT SERPL-MCNC: 7.7 G/DL (ref 6–8.4)
RBC # BLD AUTO: 4.77 M/UL (ref 4.6–6.2)
SODIUM SERPL-SCNC: 140 MMOL/L (ref 136–145)
TRIGL SERPL-MCNC: 63 MG/DL (ref 30–150)
WBC # BLD AUTO: 3.42 K/UL (ref 3.9–12.7)

## 2025-01-06 PROCEDURE — 80061 LIPID PANEL: CPT | Performed by: FAMILY MEDICINE

## 2025-01-06 PROCEDURE — 1160F RVW MEDS BY RX/DR IN RCRD: CPT | Mod: CPTII,S$GLB,, | Performed by: FAMILY MEDICINE

## 2025-01-06 PROCEDURE — 99214 OFFICE O/P EST MOD 30 MIN: CPT | Mod: S$GLB,,, | Performed by: FAMILY MEDICINE

## 2025-01-06 PROCEDURE — 3074F SYST BP LT 130 MM HG: CPT | Mod: CPTII,S$GLB,, | Performed by: FAMILY MEDICINE

## 2025-01-06 PROCEDURE — 1159F MED LIST DOCD IN RCRD: CPT | Mod: CPTII,S$GLB,, | Performed by: FAMILY MEDICINE

## 2025-01-06 PROCEDURE — 85027 COMPLETE CBC AUTOMATED: CPT | Performed by: FAMILY MEDICINE

## 2025-01-06 PROCEDURE — 83036 HEMOGLOBIN GLYCOSYLATED A1C: CPT | Performed by: FAMILY MEDICINE

## 2025-01-06 PROCEDURE — 36415 COLL VENOUS BLD VENIPUNCTURE: CPT | Mod: PO | Performed by: FAMILY MEDICINE

## 2025-01-06 PROCEDURE — 84165 PROTEIN E-PHORESIS SERUM: CPT | Mod: 26,,, | Performed by: PATHOLOGY

## 2025-01-06 PROCEDURE — 99999 PR PBB SHADOW E&M-EST. PATIENT-LVL IV: CPT | Mod: PBBFAC,,, | Performed by: FAMILY MEDICINE

## 2025-01-06 PROCEDURE — 3008F BODY MASS INDEX DOCD: CPT | Mod: CPTII,S$GLB,, | Performed by: FAMILY MEDICINE

## 2025-01-06 PROCEDURE — 84165 PROTEIN E-PHORESIS SERUM: CPT | Performed by: FAMILY MEDICINE

## 2025-01-06 PROCEDURE — 85007 BL SMEAR W/DIFF WBC COUNT: CPT | Performed by: FAMILY MEDICINE

## 2025-01-06 PROCEDURE — 80053 COMPREHEN METABOLIC PANEL: CPT | Performed by: FAMILY MEDICINE

## 2025-01-06 PROCEDURE — 3079F DIAST BP 80-89 MM HG: CPT | Mod: CPTII,S$GLB,, | Performed by: FAMILY MEDICINE

## 2025-01-06 RX ORDER — IBUPROFEN 800 MG/1
800 TABLET ORAL 3 TIMES DAILY
COMMUNITY
Start: 2024-11-18 | End: 2025-01-06

## 2025-01-06 NOTE — PROGRESS NOTES
Subjective     Patient ID: Weston Morgan is a 49 y.o. male.    Chief Complaint: Diabetes, Hypertension, and Follow-up (3 months)    History of Present Illness    Weston presents for a follow-up visit to discuss recent lab results and ongoing care with their hematologist, as well as to address recent shoulder and neck pain.    HPI:  Weston reports shoulder pain that started before the holiday season and intensified to a constant level. This led to a visit to an urgent care facility in [place] on a weekend. Initially diagnosed with nerve pain attributed to exercise, a cervical x-ray was performed upon request, revealing bone spurs and misalignment. The urgent care suggested this could be related to sleep position. Weston acquired a neck brace for nocturnal use but after taking two ibuprofen, the pain resolved completely and has not recurred.    Weston is following up with a hematologist for leukopenia. A hip biopsy for bone marrow sampling was proposed, but the patient declined, preferring to delay the procedure. They agreed to monthly labs to monitor for changes. The hematologist indicated that if results remain consistent after three rounds of testing, further investigation would be pursued.    Weston worked through the holiday season. He adhered to a diet and implemented changes prior to his last blood test, expressing interest in the impact on his results.    Weston has been taking Plavix and consulted with his cardiologist about transitioning to baby aspirin once his current supply is exhausted.    Weston denies current shoulder or neck pain.    MEDICATIONS:  Weston is on Plavix for platelets, which will be replaced with baby aspirin when the supply runs out. He is to start baby aspirin to replace Plavix for platelets when Plavix is no longer available.    MEDICAL HISTORY:  Weston has a history of diabetes and low white blood cell count.    TEST RESULTS:  Weston has undergone several labs in the past. These include a CBC  (Complete Blood Count) ordered by a hematologist, A1C, calcium, lipoprotein A, and a lipid panel for cholesterol. He also had a comprehensive metabolic panel, which included measurements of sodium, potassium, kidney function, and liver function.    IMAGING:  Weston had an X-ray of the neck before Christmas. The results revealed bone spurs and misalignment, possibly due to his sleep position.      ROS:  General: -fever, -chills, -fatigue, -weight gain, -weight loss  Eyes: -vision changes, -redness, -discharge  ENT: -ear pain, -nasal congestion, -sore throat  Cardiovascular: -chest pain, -palpitations, -lower extremity edema  Respiratory: -cough, -shortness of breath  Gastrointestinal: -abdominal pain, -nausea, -vomiting, -diarrhea, -constipation, -blood in stool  Genitourinary: -dysuria, -hematuria, -frequency  Musculoskeletal: -joint pain, -muscle pain, -joint stiffness  Skin: -rash, -lesion  Neurological: -headache, -dizziness, -numbness, -tingling  Psychiatric: -anxiety, -depression, -sleep difficulty            Objective     Physical Exam  Vitals and nursing note reviewed.   Constitutional:       Appearance: Normal appearance.   HENT:      Head: Normocephalic and atraumatic.   Eyes:      Extraocular Movements: Extraocular movements intact.      Conjunctiva/sclera: Conjunctivae normal.   Cardiovascular:      Rate and Rhythm: Normal rate and regular rhythm.   Pulmonary:      Effort: Pulmonary effort is normal.      Breath sounds: Normal breath sounds.   Musculoskeletal:         General: Normal range of motion.      Cervical back: Normal range of motion and neck supple.      Right lower leg: No edema.      Left lower leg: No edema.   Skin:     General: Skin is warm and dry.   Neurological:      General: No focal deficit present.      Mental Status: He is alert and oriented to person, place, and time.   Psychiatric:         Mood and Affect: Mood normal.         Behavior: Behavior normal.                Assessment and  Plan     1. Primary hypertension  Overview:  stable cont diet cotnrol    Orders:  -     CBC W/ AUTO DIFFERENTIAL; Future; Expected date: 01/06/2025  -     COMPREHENSIVE METABOLIC PANEL; Future; Expected date: 01/06/2025    2. Encounter for screening for cardiovascular disorders  -     CT Cardiac Scoring; Future; Expected date: 01/06/2025  -     LIPID PANEL; Future; Expected date: 01/06/2025    3. Leukopenia, unspecified type  -     PROTEIN ELECTROPHORESIS, SERUM; Future; Expected date: 01/06/2025    4. History of heart attack  Overview:  stable cont rosuvaastatin and plavix      5. Type 2 diabetes mellitus without complication, without long-term current use of insulin  Overview:  stable cont jardiance and metformin    Orders:  -     HEMOGLOBIN A1C; Future; Expected date: 01/06/2025        Assessment & Plan    Evaluated ongoing hematology concerns, noting hematologist's plan for bone marrow biopsy if labs remains unchanged after 3 months  Assessed recent episode of shoulder/neck pain, likely due to arthritic changes and muscle tightness  Reviewed cardiac risk factors and ordered calcium scoring test for further evaluation  Considered diabetes management and performed diabetic foot exam    PATIENT EDUCATION:   Explained that bone spurs and cervical spine changes are common and not necessarily concerning   Discussed importance of maintaining neck flexibility through movement rather than using a neck brace for cervical spine issues    ACTION ITEMS/LIFESTYLE:   Weston to continue current diet changes   Recommend maintaining physical activity to preserve neck flexibility    MEDICATIONS:   Continued baby aspirin as recommended by cardiologist to replace Plavix after therapy is completed   Continued allergy medication as needed seasonally    ORDERS:   CBC, lipid panel, comprehensive metabolic panel, and protein electrophoresis ordered   CT cardiac calcium scoring ordered   Diabetic foot exam performed    REFERRALS:   Will  provide orthopedic referral if shoulder/neck pain recurs    FOLLOW UP:   Contact office if shoulder/neck pain worsens   Follow up to schedule eye exam (previous exam  in December)              Follow up in about 3 months (around 2025).    This note was generated with the assistance of ambient listening technology. Verbal consent was obtained by the patient and accompanying visitor(s) for the recording of patient appointment to facilitate this note. I attest to having reviewed and edited the generated note for accuracy, though some syntax or spelling errors may persist. Please contact the author of this note for any clarification.

## 2025-01-07 LAB
ALBUMIN SERPL ELPH-MCNC: 4.26 G/DL (ref 3.35–5.55)
ALPHA1 GLOB SERPL ELPH-MCNC: 0.2 G/DL (ref 0.17–0.41)
ALPHA2 GLOB SERPL ELPH-MCNC: 0.64 G/DL (ref 0.43–0.99)
B-GLOBULIN SERPL ELPH-MCNC: 0.75 G/DL (ref 0.5–1.1)
GAMMA GLOB SERPL ELPH-MCNC: 1.16 G/DL (ref 0.67–1.58)
PROT SERPL-MCNC: 7 G/DL (ref 6–8.4)

## 2025-01-08 LAB — PATHOLOGIST INTERPRETATION SPE: NORMAL

## 2025-02-13 ENCOUNTER — LAB VISIT (OUTPATIENT)
Dept: LAB | Facility: HOSPITAL | Age: 50
End: 2025-02-13
Attending: FAMILY MEDICINE
Payer: COMMERCIAL

## 2025-02-13 DIAGNOSIS — D70.9 NEUTROPENIA, UNSPECIFIED TYPE: ICD-10-CM

## 2025-02-13 DIAGNOSIS — D72.819 LEUKOPENIA, UNSPECIFIED TYPE: ICD-10-CM

## 2025-02-13 LAB
BASOPHILS # BLD AUTO: 0.06 K/UL (ref 0–0.2)
BASOPHILS NFR BLD: 1.7 % (ref 0–1.9)
DIFFERENTIAL METHOD BLD: ABNORMAL
EOSINOPHIL # BLD AUTO: 0.2 K/UL (ref 0–0.5)
EOSINOPHIL NFR BLD: 5.8 % (ref 0–8)
ERYTHROCYTE [DISTWIDTH] IN BLOOD BY AUTOMATED COUNT: 12.8 % (ref 11.5–14.5)
HCT VFR BLD AUTO: 46 % (ref 40–54)
HGB BLD-MCNC: 15.4 G/DL (ref 14–18)
IMM GRANULOCYTES # BLD AUTO: 0 K/UL (ref 0–0.04)
IMM GRANULOCYTES NFR BLD AUTO: 0 % (ref 0–0.5)
LYMPHOCYTES # BLD AUTO: 2.1 K/UL (ref 1–4.8)
LYMPHOCYTES NFR BLD: 59.1 % (ref 18–48)
MCH RBC QN AUTO: 32 PG (ref 27–31)
MCHC RBC AUTO-ENTMCNC: 33.5 G/DL (ref 32–36)
MCV RBC AUTO: 96 FL (ref 82–98)
MONOCYTES # BLD AUTO: 0.4 K/UL (ref 0.3–1)
MONOCYTES NFR BLD: 10.6 % (ref 4–15)
NEUTROPHILS # BLD AUTO: 0.8 K/UL (ref 1.8–7.7)
NEUTROPHILS NFR BLD: 22.8 % (ref 38–73)
NRBC BLD-RTO: 0 /100 WBC
PLATELET # BLD AUTO: 182 K/UL (ref 150–450)
PMV BLD AUTO: 10.4 FL (ref 9.2–12.9)
RBC # BLD AUTO: 4.81 M/UL (ref 4.6–6.2)
WBC # BLD AUTO: 3.59 K/UL (ref 3.9–12.7)

## 2025-02-13 PROCEDURE — 36415 COLL VENOUS BLD VENIPUNCTURE: CPT | Mod: PO | Performed by: NURSE PRACTITIONER

## 2025-02-13 PROCEDURE — 85025 COMPLETE CBC W/AUTO DIFF WBC: CPT | Performed by: NURSE PRACTITIONER

## 2025-03-12 ENCOUNTER — LAB VISIT (OUTPATIENT)
Dept: LAB | Facility: HOSPITAL | Age: 50
End: 2025-03-12
Attending: NURSE PRACTITIONER

## 2025-03-12 DIAGNOSIS — D70.9 NEUTROPENIA, UNSPECIFIED TYPE: ICD-10-CM

## 2025-03-12 DIAGNOSIS — D72.819 LEUKOPENIA, UNSPECIFIED TYPE: ICD-10-CM

## 2025-03-12 LAB
BASOPHILS # BLD AUTO: 0.07 K/UL (ref 0–0.2)
BASOPHILS NFR BLD: 1.9 % (ref 0–1.9)
DIFFERENTIAL METHOD BLD: ABNORMAL
EOSINOPHIL # BLD AUTO: 0.2 K/UL (ref 0–0.5)
EOSINOPHIL NFR BLD: 6 % (ref 0–8)
ERYTHROCYTE [DISTWIDTH] IN BLOOD BY AUTOMATED COUNT: 12.8 % (ref 11.5–14.5)
HCT VFR BLD AUTO: 44.9 % (ref 40–54)
HGB BLD-MCNC: 15.1 G/DL (ref 14–18)
IMM GRANULOCYTES # BLD AUTO: 0.01 K/UL (ref 0–0.04)
IMM GRANULOCYTES NFR BLD AUTO: 0.3 % (ref 0–0.5)
LYMPHOCYTES # BLD AUTO: 2.1 K/UL (ref 1–4.8)
LYMPHOCYTES NFR BLD: 57.4 % (ref 18–48)
MCH RBC QN AUTO: 31.9 PG (ref 27–31)
MCHC RBC AUTO-ENTMCNC: 33.6 G/DL (ref 32–36)
MCV RBC AUTO: 95 FL (ref 82–98)
MONOCYTES # BLD AUTO: 0.4 K/UL (ref 0.3–1)
MONOCYTES NFR BLD: 9.9 % (ref 4–15)
NEUTROPHILS # BLD AUTO: 0.9 K/UL (ref 1.8–7.7)
NEUTROPHILS NFR BLD: 24.5 % (ref 38–73)
NRBC BLD-RTO: 0 /100 WBC
PLATELET # BLD AUTO: 197 K/UL (ref 150–450)
PMV BLD AUTO: 10.3 FL (ref 9.2–12.9)
RBC # BLD AUTO: 4.74 M/UL (ref 4.6–6.2)
WBC # BLD AUTO: 3.64 K/UL (ref 3.9–12.7)

## 2025-03-12 PROCEDURE — 85025 COMPLETE CBC W/AUTO DIFF WBC: CPT | Performed by: NURSE PRACTITIONER

## 2025-03-12 PROCEDURE — 36415 COLL VENOUS BLD VENIPUNCTURE: CPT | Mod: PO | Performed by: NURSE PRACTITIONER

## 2025-03-13 ENCOUNTER — OFFICE VISIT (OUTPATIENT)
Dept: HEMATOLOGY/ONCOLOGY | Facility: CLINIC | Age: 50
End: 2025-03-13

## 2025-03-13 VITALS
BODY MASS INDEX: 28.07 KG/M2 | SYSTOLIC BLOOD PRESSURE: 121 MMHG | HEART RATE: 91 BPM | WEIGHT: 218.69 LBS | DIASTOLIC BLOOD PRESSURE: 80 MMHG | HEIGHT: 74 IN

## 2025-03-13 DIAGNOSIS — D70.9 NEUTROPENIA, UNSPECIFIED TYPE: Primary | ICD-10-CM

## 2025-03-13 PROCEDURE — 99215 OFFICE O/P EST HI 40 MIN: CPT | Mod: S$PBB,,, | Performed by: NURSE PRACTITIONER

## 2025-03-13 PROCEDURE — G2211 COMPLEX E/M VISIT ADD ON: HCPCS | Mod: S$PBB,,, | Performed by: NURSE PRACTITIONER

## 2025-03-13 PROCEDURE — 99213 OFFICE O/P EST LOW 20 MIN: CPT | Mod: PBBFAC,PO | Performed by: NURSE PRACTITIONER

## 2025-03-13 PROCEDURE — 99999 PR PBB SHADOW E&M-EST. PATIENT-LVL III: CPT | Mod: PBBFAC,,, | Performed by: NURSE PRACTITIONER

## 2025-03-13 NOTE — PROGRESS NOTES
The patient location is: home  The chief complaint leading to consultation is: no complaints    Visit type: audiovisual    Face to Face time with patient: 35  60 minutes of total time spent on the encounter, which includes face to face time and non-face to face time preparing to see the patient (eg, review of tests), Obtaining and/or reviewing separately obtained history, Documenting clinical information in the electronic or other health record, Independently interpreting results (not separately reported) and communicating results to the patient/family/caregiver, or Care coordination (not separately reported).     Each patient to whom he or she provides medical services by telemedicine is:  (1) informed of the relationship between the physician and patient and the respective role of any other health care provider with respect to management of the patient; and (2) notified that he or she may decline to receive medical services by telemedicine and may withdraw from such care at any time.      Patient ID: Weston Morgan is a 49 y.o. male.    Chief Complaint: no complaints    HPI:  50 yo male presents to the clinic as a new patient for further evaluation and recommendation.  Consulted for leukopenia.      Noted outside labs in care everywhere. 6/12/2023 wbc normal low ANC 1.19.  He state on 6/10/2024 was admitted to [facility] in the [place] area for what was considered a mild heart attack.  Starting in 9/2024 with noted low WBC and ANC ranging from 0.7-0.9.      Denies frequent infections requiring abx.  Denies f/c/ns. Denies any unintentional weight loss.  He has had intentional weight loss.  Denies any known abnormal lymphadenopathy.    States that he is going through a divorce right now so has increased stress.     States that he feels perfect and works out every night.    Denies illicit drug use or cigarette smoking.  Drinks a 6 pack of beer on Saturdays.    Family hx of cancer:  No known family hx  of cancer    Review of medications without adverse reaction including neutropenia    Interval History:  3/13/2025 Continued leukopenia with neutropenia wbc 3.64 (3.59), anc 0.9 (0.8).  No frequent infections with need for abx use. Has no complaints today and states that he is feeling good.     I have reviewed all of the patient's relevant lab work available in the medical record and have utilized this in my evaluation and management recommendations today.      Social History     Socioeconomic History    Marital status: Legally     Number of children: 2   Tobacco Use    Smoking status: Never     Passive exposure: Never    Smokeless tobacco: Never   Substance and Sexual Activity    Alcohol use: Yes     Alcohol/week: 6.0 standard drinks of alcohol     Types: 6 Cans of beer per week    Drug use: Never    Sexual activity: Yes     Partners: Female     Birth control/protection: None     Social Drivers of Health     Financial Resource Strain: Low Risk  (9/25/2024)    Overall Financial Resource Strain (CARDIA)     Difficulty of Paying Living Expenses: Not hard at all   Food Insecurity: Patient Declined (9/25/2024)    Hunger Vital Sign     Worried About Running Out of Food in the Last Year: Patient declined     Ran Out of Food in the Last Year: Patient declined   Transportation Needs: Unknown (6/10/2023)    Received from [facility]    PRAPARE - Transportation     Lack of Transportation (Medical): No   Physical Activity: Unknown (9/25/2024)    Exercise Vital Sign     Days of Exercise per Week: 2 days   Stress: No Stress Concern Present (9/25/2024)    [employer] [place] [employer] - Occupational Stress Questionnaire     Feeling of Stress : Only a little   Housing Stability: Unknown (9/25/2024)    Housing Stability Vital Sign     Unable to Pay for Housing in the Last Year: Patient declined       Family History   Problem Relation Name Age of Onset    Heart disease Mother Julianne      Hyperlipidemia Mother Julianne     Hypertension Mother Julianne     Diabetes Father Carlos        History reviewed. No pertinent surgical history.    Past Medical History:   Diagnosis Date    Diabetes mellitus, type 2     Heart attack 06/10/2023    Mild    Hyperlipidemia     Hypertension        Review of Systems   Constitutional:  Negative for appetite change and unexpected weight change.   HENT:  Negative for mouth sores.    Eyes:  Negative for visual disturbance.   Respiratory:  Negative for cough and shortness of breath.    Cardiovascular:  Negative for chest pain.   Gastrointestinal:  Negative for abdominal pain and diarrhea.   Genitourinary:  Negative for frequency.   Musculoskeletal:  Negative for back pain.   Skin:  Negative for rash.   Neurological:  Negative for headaches.   Hematological:  Negative for adenopathy.   Psychiatric/Behavioral:  The patient is not nervous/anxious.           Medication List with Changes/Refills   Current Medications    CETIRIZINE (ZYRTEC) 10 MG TABLET    Take 10 mg by mouth once daily.    CLOPIDOGREL (PLAVIX) 75 MG TABLET    Take 75 mg by mouth once daily.    JARDIANCE 25 MG TABLET    Take 25 mg by mouth once daily.    METFORMIN (GLUCOPHAGE) 500 MG TABLET    Take 500 mg by mouth nightly.    ROSUVASTATIN (CRESTOR) 40 MG TAB    Take 40 mg by mouth once daily.        Objective:     Vitals:    03/13/25 1342   BP: 121/80   Pulse: 91       Physical Exam  Constitutional:       Appearance: Normal appearance.   Pulmonary:      Effort: Pulmonary effort is normal.   Neurological:      Mental Status: He is alert and oriented to person, place, and time.   Psychiatric:         Mood and Affect: Mood normal.         Behavior: Behavior normal.         Thought Content: Thought content normal.         Judgment: Judgment normal.         Assessment:     Problem List Items Addressed This Visit    None  Visit Diagnoses         Neutropenia, unspecified type    -  Primary              Lab Results  "  Component Value Date    WBC 3.64 (L) 03/12/2025    RBC 4.74 03/12/2025    HGB 15.1 03/12/2025    HCT 44.9 03/12/2025    MCV 95 03/12/2025    MCH 31.9 (H) 03/12/2025    MCHC 33.6 03/12/2025    RDW 12.8 03/12/2025     03/12/2025    MPV 10.3 03/12/2025    GRAN 0.9 (L) 03/12/2025    GRAN 24.5 (L) 03/12/2025    LYMPH 2.1 03/12/2025    LYMPH 57.4 (H) 03/12/2025    MONO 0.4 03/12/2025    MONO 9.9 03/12/2025    EOS 0.2 03/12/2025    BASO 0.07 03/12/2025    EOSINOPHIL 6.0 03/12/2025    BASOPHIL 1.9 03/12/2025      Lab Results   Component Value Date     01/06/2025    K 4.5 01/06/2025     01/06/2025    CO2 26 01/06/2025    BUN 17 01/06/2025    CREATININE 0.8 01/06/2025    CALCIUM 9.2 01/06/2025    ANIONGAP 10 01/06/2025     Lab Results   Component Value Date    ALT 20 01/06/2025    AST 27 01/06/2025    ALKPHOS 78 01/06/2025    BILITOT 0.4 01/06/2025     No results found for: "IRON", "TRANSFERRIN", "TIBC", "FESATURATED", "FERRITIN"   Lab Results   Component Value Date    HEPCAB Non-reactive 09/06/2024     Lab Results   Component Value Date    [id number] Non-reactive 09/06/2024     No results found for: "ALBER" negtive    Plan:   Neutropenia, unspecified type  -     Cancel: CMV DNA, Quantitative, PCR; Future  -     Cancel: EB VIRUS DNA DETECTOR, QUALITATIVE; Future; Expected date: 03/13/2025  -     Cancel: Type & Screen; Future; Expected date: 03/13/2025        Workup for leukopenia/neutropenia unremarkable.  Discuss evaluation with bone marrow biopsy vs watching with labs for worsening neutropenia or development of additional cytopenias.  Will continue to monitor with labs every 6 months.  He knows to reach out to me if he starts to develop frequent infections in the meantime that require abx use.       Med Onc Chart Routing      Follow up with physician    Follow up with SHARON 6 months. VV w/ labs prior   Infusion scheduling note   n/a   Injection scheduling note n/a   Labs   Scheduling:  Preferred lab: [facility]  Lab interval:  cbc prior to next visit   Imaging   N/a   Pharmacy appointment No pharmacy appointment needed      Other referrals       No additional referrals needed  n/a          Total time spent on encounter: 45 minutes    Collaborating Provider:  Dr. Stu Augustin    Thank You,  Vinh Natarajan, [email or web address]  Benign Hematology

## 2025-05-21 ENCOUNTER — OFFICE VISIT (OUTPATIENT)
Dept: INTERNAL MEDICINE | Facility: CLINIC | Age: 50
End: 2025-05-21
Payer: COMMERCIAL

## 2025-05-21 ENCOUNTER — LAB VISIT (OUTPATIENT)
Dept: LAB | Facility: HOSPITAL | Age: 50
End: 2025-05-21
Attending: FAMILY MEDICINE
Payer: COMMERCIAL

## 2025-05-21 VITALS
HEIGHT: 74 IN | OXYGEN SATURATION: 99 % | HEART RATE: 71 BPM | BODY MASS INDEX: 28.21 KG/M2 | DIASTOLIC BLOOD PRESSURE: 78 MMHG | TEMPERATURE: 97 F | WEIGHT: 219.81 LBS | SYSTOLIC BLOOD PRESSURE: 122 MMHG

## 2025-05-21 DIAGNOSIS — D72.819 LEUKOPENIA, UNSPECIFIED TYPE: ICD-10-CM

## 2025-05-21 DIAGNOSIS — I10 PRIMARY HYPERTENSION: ICD-10-CM

## 2025-05-21 DIAGNOSIS — Z00.00 ROUTINE GENERAL MEDICAL EXAMINATION AT A HEALTH CARE FACILITY: ICD-10-CM

## 2025-05-21 DIAGNOSIS — E11.9 TYPE 2 DIABETES MELLITUS WITHOUT COMPLICATION, WITHOUT LONG-TERM CURRENT USE OF INSULIN: ICD-10-CM

## 2025-05-21 DIAGNOSIS — I25.2 HISTORY OF HEART ATTACK: ICD-10-CM

## 2025-05-21 DIAGNOSIS — J06.9 UPPER RESPIRATORY TRACT INFECTION, UNSPECIFIED TYPE: ICD-10-CM

## 2025-05-21 DIAGNOSIS — Z12.5 SPECIAL SCREENING FOR MALIGNANT NEOPLASM OF PROSTATE: ICD-10-CM

## 2025-05-21 DIAGNOSIS — J06.9 UPPER RESPIRATORY TRACT INFECTION, UNSPECIFIED TYPE: Primary | ICD-10-CM

## 2025-05-21 DIAGNOSIS — Z00.00 ROUTINE GENERAL MEDICAL EXAMINATION AT A HEALTH CARE FACILITY: Primary | ICD-10-CM

## 2025-05-21 LAB
ABSOLUTE EOSINOPHIL (OHS): 0.17 K/UL
ABSOLUTE MONOCYTE (OHS): 0.62 K/UL (ref 0.3–1)
ABSOLUTE NEUTROPHIL COUNT (OHS): 2.93 K/UL (ref 1.8–7.7)
ALBUMIN SERPL BCP-MCNC: 4 G/DL (ref 3.5–5.2)
ALBUMIN/CREAT UR: NORMAL
ALP SERPL-CCNC: 80 UNIT/L (ref 40–150)
ALT SERPL W/O P-5'-P-CCNC: 13 UNIT/L (ref 10–44)
ANION GAP (OHS): 14 MMOL/L (ref 8–16)
AST SERPL-CCNC: 16 UNIT/L (ref 11–45)
BASOPHILS # BLD AUTO: 0.07 K/UL
BASOPHILS NFR BLD AUTO: 1.3 %
BILIRUB SERPL-MCNC: 0.7 MG/DL (ref 0.1–1)
BUN SERPL-MCNC: 11 MG/DL (ref 6–20)
CALCIUM SERPL-MCNC: 9.7 MG/DL (ref 8.7–10.5)
CHLORIDE SERPL-SCNC: 99 MMOL/L (ref 95–110)
CHOLEST SERPL-MCNC: 192 MG/DL (ref 120–199)
CHOLEST/HDLC SERPL: 3.6 {RATIO} (ref 2–5)
CO2 SERPL-SCNC: 27 MMOL/L (ref 23–29)
CREAT SERPL-MCNC: 0.8 MG/DL (ref 0.5–1.4)
CREAT UR-MCNC: 89 MG/DL (ref 23–375)
EAG (OHS): 103 MG/DL (ref 68–131)
ERYTHROCYTE [DISTWIDTH] IN BLOOD BY AUTOMATED COUNT: 14.1 % (ref 11.5–14.5)
ERYTHROCYTE [SEDIMENTATION RATE] IN BLOOD BY PHOTOMETRIC METHOD: 11 MM/HR
FOLATE SERPL-MCNC: 8.2 NG/ML (ref 4–24)
GFR SERPLBLD CREATININE-BSD FMLA CKD-EPI: >60 ML/MIN/1.73/M2
GLUCOSE SERPL-MCNC: 92 MG/DL (ref 70–110)
HBA1C MFR BLD: 5.2 % (ref 4–5.6)
HCT VFR BLD AUTO: 49.8 % (ref 40–54)
HDLC SERPL-MCNC: 54 MG/DL (ref 40–75)
HDLC SERPL: 28.1 % (ref 20–50)
HGB BLD-MCNC: 15.5 GM/DL (ref 14–18)
IMM GRANULOCYTES # BLD AUTO: 0.01 K/UL (ref 0–0.04)
IMM GRANULOCYTES NFR BLD AUTO: 0.2 % (ref 0–0.5)
IRON SATN MFR SERPL: 12 % (ref 20–50)
IRON SERPL-MCNC: 43 UG/DL (ref 45–160)
LDLC SERPL CALC-MCNC: 116.2 MG/DL (ref 63–159)
LYMPHOCYTES # BLD AUTO: 1.71 K/UL (ref 1–4.8)
MCH RBC QN AUTO: 31.3 PG (ref 27–31)
MCHC RBC AUTO-ENTMCNC: 31.1 G/DL (ref 32–36)
MCV RBC AUTO: 100 FL (ref 82–98)
MICROALBUMIN UR-MCNC: <5 UG/ML (ref ?–5000)
NONHDLC SERPL-MCNC: 138 MG/DL
NUCLEATED RBC (/100WBC) (OHS): 0 /100 WBC
PLATELET # BLD AUTO: 245 K/UL (ref 150–450)
PMV BLD AUTO: 9.8 FL (ref 9.2–12.9)
POTASSIUM SERPL-SCNC: 4 MMOL/L (ref 3.5–5.1)
PROT SERPL-MCNC: 8 GM/DL (ref 6–8.4)
PSA SERPL-MCNC: 0.68 NG/ML
RBC # BLD AUTO: 4.96 M/UL (ref 4.6–6.2)
RELATIVE EOSINOPHIL (OHS): 3.1 %
RELATIVE LYMPHOCYTE (OHS): 31 % (ref 18–48)
RELATIVE MONOCYTE (OHS): 11.3 % (ref 4–15)
RELATIVE NEUTROPHIL (OHS): 53.1 % (ref 38–73)
SODIUM SERPL-SCNC: 140 MMOL/L (ref 136–145)
T4 FREE SERPL-MCNC: 1.15 NG/DL (ref 0.71–1.51)
TIBC SERPL-MCNC: 363 UG/DL (ref 250–450)
TRANSFERRIN SERPL-MCNC: 245 MG/DL (ref 200–375)
TRIGL SERPL-MCNC: 109 MG/DL (ref 30–150)
TSH SERPL-ACNC: 1.03 UIU/ML (ref 0.4–4)
WBC # BLD AUTO: 5.51 K/UL (ref 3.9–12.7)

## 2025-05-21 PROCEDURE — 99999 PR PBB SHADOW E&M-EST. PATIENT-LVL IV: CPT | Mod: PBBFAC,,, | Performed by: FAMILY MEDICINE

## 2025-05-21 PROCEDURE — 82043 UR ALBUMIN QUANTITATIVE: CPT | Performed by: FAMILY MEDICINE

## 2025-05-21 PROCEDURE — 82746 ASSAY OF FOLIC ACID SERUM: CPT

## 2025-05-21 PROCEDURE — 83036 HEMOGLOBIN GLYCOSYLATED A1C: CPT

## 2025-05-21 PROCEDURE — 80061 LIPID PANEL: CPT

## 2025-05-21 PROCEDURE — 36415 COLL VENOUS BLD VENIPUNCTURE: CPT | Mod: PO

## 2025-05-21 PROCEDURE — 84439 ASSAY OF FREE THYROXINE: CPT

## 2025-05-21 PROCEDURE — 84153 ASSAY OF PSA TOTAL: CPT

## 2025-05-21 PROCEDURE — 83020 HEMOGLOBIN ELECTROPHORESIS: CPT

## 2025-05-21 PROCEDURE — 85025 COMPLETE CBC W/AUTO DIFF WBC: CPT

## 2025-05-21 PROCEDURE — 80053 COMPREHEN METABOLIC PANEL: CPT

## 2025-05-21 PROCEDURE — 84425 ASSAY OF VITAMIN B-1: CPT

## 2025-05-21 PROCEDURE — 86038 ANTINUCLEAR ANTIBODIES: CPT

## 2025-05-21 PROCEDURE — 84443 ASSAY THYROID STIM HORMONE: CPT

## 2025-05-21 PROCEDURE — 83540 ASSAY OF IRON: CPT

## 2025-05-21 PROCEDURE — 85652 RBC SED RATE AUTOMATED: CPT

## 2025-05-21 PROCEDURE — 84207 ASSAY OF VITAMIN B-6: CPT

## 2025-05-21 RX ORDER — ASPIRIN 81 MG/1
81 TABLET ORAL DAILY
COMMUNITY

## 2025-05-22 LAB — ANA (OHS): NORMAL

## 2025-05-23 LAB
HGB A MFR BLD ELPH: 97.4 % (ref 95.8–98)
HGB A2 MFR BLD ELPH: 2.6 % (ref 2–3.3)
HGB F MFR BLD ELPH: 0 % (ref 0–0.9)
HGB FRACT BLD ELPH-IMP: NORMAL
M HPLC HB VARIANT, B: NORMAL

## 2025-05-28 LAB
W VITAMIN B1: 76 UG/L
W VITAMIN B6: 10 UG/L

## 2025-06-05 DIAGNOSIS — I10 PRIMARY HYPERTENSION: Primary | ICD-10-CM

## 2025-06-09 ENCOUNTER — OFFICE VISIT (OUTPATIENT)
Dept: CARDIOLOGY | Facility: CLINIC | Age: 50
End: 2025-06-09
Payer: COMMERCIAL

## 2025-06-09 ENCOUNTER — HOSPITAL ENCOUNTER (OUTPATIENT)
Dept: CARDIOLOGY | Facility: HOSPITAL | Age: 50
Discharge: HOME | End: 2025-06-09
Attending: INTERNAL MEDICINE
Payer: COMMERCIAL

## 2025-06-09 VITALS
OXYGEN SATURATION: 98 % | WEIGHT: 220.25 LBS | SYSTOLIC BLOOD PRESSURE: 130 MMHG | BODY MASS INDEX: 28.28 KG/M2 | DIASTOLIC BLOOD PRESSURE: 80 MMHG | HEART RATE: 58 BPM

## 2025-06-09 DIAGNOSIS — I10 PRIMARY HYPERTENSION: ICD-10-CM

## 2025-06-09 DIAGNOSIS — E78.2 MIXED HYPERLIPIDEMIA: Chronic | ICD-10-CM

## 2025-06-09 DIAGNOSIS — I25.2 HISTORY OF HEART ATTACK: Primary | Chronic | ICD-10-CM

## 2025-06-09 DIAGNOSIS — E11.9 TYPE 2 DIABETES MELLITUS WITHOUT COMPLICATION, WITHOUT LONG-TERM CURRENT USE OF INSULIN: Chronic | ICD-10-CM

## 2025-06-09 LAB
OHS QRS DURATION: 92 MS
OHS QTC CALCULATION: 451 MS

## 2025-06-09 PROCEDURE — 3044F HG A1C LEVEL LT 7.0%: CPT | Mod: CPTII,S$GLB,, | Performed by: INTERNAL MEDICINE

## 2025-06-09 PROCEDURE — 93010 ELECTROCARDIOGRAM REPORT: CPT | Mod: ,,, | Performed by: INTERNAL MEDICINE

## 2025-06-09 PROCEDURE — 99999 PR PBB SHADOW E&M-EST. PATIENT-LVL III: CPT | Mod: PBBFAC,,, | Performed by: INTERNAL MEDICINE

## 2025-06-09 PROCEDURE — 3061F NEG MICROALBUMINURIA REV: CPT | Mod: CPTII,S$GLB,, | Performed by: INTERNAL MEDICINE

## 2025-06-09 PROCEDURE — 1160F RVW MEDS BY RX/DR IN RCRD: CPT | Mod: CPTII,S$GLB,, | Performed by: INTERNAL MEDICINE

## 2025-06-09 PROCEDURE — 3066F NEPHROPATHY DOC TX: CPT | Mod: CPTII,S$GLB,, | Performed by: INTERNAL MEDICINE

## 2025-06-09 PROCEDURE — 3075F SYST BP GE 130 - 139MM HG: CPT | Mod: CPTII,S$GLB,, | Performed by: INTERNAL MEDICINE

## 2025-06-09 PROCEDURE — 1159F MED LIST DOCD IN RCRD: CPT | Mod: CPTII,S$GLB,, | Performed by: INTERNAL MEDICINE

## 2025-06-09 PROCEDURE — 93005 ELECTROCARDIOGRAM TRACING: CPT | Mod: PO

## 2025-06-09 PROCEDURE — 99204 OFFICE O/P NEW MOD 45 MIN: CPT | Mod: S$GLB,,, | Performed by: INTERNAL MEDICINE

## 2025-06-09 PROCEDURE — 3008F BODY MASS INDEX DOCD: CPT | Mod: CPTII,S$GLB,, | Performed by: INTERNAL MEDICINE

## 2025-06-09 PROCEDURE — 3079F DIAST BP 80-89 MM HG: CPT | Mod: CPTII,S$GLB,, | Performed by: INTERNAL MEDICINE

## 2025-06-09 RX ORDER — CLOPIDOGREL BISULFATE 75 MG/1
75 TABLET ORAL DAILY
Qty: 90 TABLET | Refills: 1 | Status: SHIPPED | OUTPATIENT
Start: 2025-06-09 | End: 2025-06-09

## 2025-06-09 RX ORDER — ASPIRIN 81 MG/1
81 TABLET ORAL DAILY
Qty: 90 TABLET | Refills: 1 | Status: SHIPPED | OUTPATIENT
Start: 2025-06-09

## 2025-06-09 NOTE — PROGRESS NOTES
Subjective:   Patient ID:  Weston Morgan is a 49 y.o. male who presents for cardiac consult of No chief complaint on file.      Referral by: Leelee Michelle [place]  [id number] [place] [id number]     Reason for consult: HTN      HPI  The patient came in today for cardiac consult of No chief complaint on file.    6/9/25  Weston Morgan is a 49 y.o. male pt with h/o MI, HTN, HLD, overweight, DM2 here for CVD eval.    BP and HR stable today. BMI 28 - 220 lbs  Pt had an MI in June 2023, saw cardiologist in Dec 2024  He had cath which was neg, is on DAPT .      No results found for this or any previous visit.      No results found for this or any previous visit.      No results found for this or any previous visit.      No cardiac monitor results found for the past 12 months         Past Medical History:   Diagnosis Date    Diabetes mellitus, type 2     Heart attack 06/10/2023    Mild    Hyperlipidemia     Hypertension        History reviewed. No pertinent surgical history.    Social History[1]    Family History   Problem Relation Name Age of Onset    Heart disease Mother Julianne     Hyperlipidemia Mother Julianne     Hypertension Mother Julianne     Diabetes Father Carlos        Patient's Medications   New Prescriptions    No medications on file   Previous Medications    CETIRIZINE (ZYRTEC) 10 MG TABLET    Take 10 mg by mouth once daily.    JARDIANCE 25 MG TABLET    Take 25 mg by mouth once daily.    METFORMIN (GLUCOPHAGE) 500 MG TABLET    Take 500 mg by mouth nightly.    ROSUVASTATIN (CRESTOR) 40 MG TAB    Take 40 mg by mouth once daily.   Modified Medications    Modified Medication Previous Medication    ASPIRIN (ECOTRIN) 81 MG EC TABLET aspirin (ECOTRIN) 81 MG EC tablet       Take 1 tablet (81 mg total) by mouth once daily.    Take 81 mg by mouth once daily.   Discontinued Medications    CLOPIDOGREL (PLAVIX) 75 MG TABLET    Take 75 mg by mouth once daily.       Review of  Systems   Constitutional: Negative.    HENT: Negative.     Eyes: Negative.    Respiratory: Negative.     Cardiovascular: Negative.    Gastrointestinal: Negative.    Genitourinary: Negative.    Musculoskeletal: Negative.    Skin: Negative.    Neurological: Negative.    Endo/Heme/Allergies: Negative.    Psychiatric/Behavioral: Negative.     All 12 systems otherwise negative.      Wt Readings from Last 3 Encounters:   06/09/25 99.9 kg (220 lb 3.8 oz)   05/21/25 99.7 kg (219 lb 12.8 oz)   03/13/25 99.2 kg (218 lb 11.1 oz)     Temp Readings from Last 3 Encounters:   05/21/25 97.4 °F (36.3 °C) (Tympanic)   01/06/25 96.3 °F (35.7 °C) (Tympanic)   09/06/24 97.3 °F (36.3 °C) (Tympanic)     BP Readings from Last 3 Encounters:   06/09/25 130/80   05/21/25 122/78   03/13/25 121/80     Pulse Readings from Last 3 Encounters:   06/09/25 (!) 58   05/21/25 71   03/13/25 91       /80 (BP Location: Right arm, Patient Position: Sitting)   Pulse (!) 58   Wt 99.9 kg (220 lb 3.8 oz)   SpO2 98%   BMI 28.28 kg/m²     Objective:   Physical Exam  Vitals and nursing note reviewed.   Constitutional:       General: He is not in acute distress.     Appearance: He is well-developed. He is not diaphoretic.   HENT:      Head: Normocephalic and atraumatic.      Nose: Nose normal.   Eyes:      General: No scleral icterus.     Conjunctiva/sclera: Conjunctivae normal.   Neck:      Thyroid: No thyromegaly.      Vascular: No JVD.   Cardiovascular:      Rate and Rhythm: Normal rate and regular rhythm.      Heart sounds: S1 normal and S2 normal. No murmur heard.     No friction rub. No gallop. No S3 or S4 sounds.   Pulmonary:      Effort: Pulmonary effort is normal. No respiratory distress.      Breath sounds: Normal breath sounds. No stridor. No wheezing or rales.   Chest:      Chest wall: No tenderness.   Abdominal:      General: Bowel sounds are normal. There is no distension.      Palpations: Abdomen is soft. There is no mass.      Tenderness:  "There is no abdominal tenderness. There is no rebound.   Genitourinary:     Comments: Deferred  Musculoskeletal:         General: No tenderness or deformity. Normal range of motion.      Cervical back: Normal range of motion and neck supple.   Lymphadenopathy:      Cervical: No cervical adenopathy.   Skin:     General: Skin is warm and dry.      Coloration: Skin is not pale.      Findings: No erythema or rash.   Neurological:      Mental Status: He is alert and oriented to person, place, and time.      Motor: No abnormal muscle tone.      Coordination: Coordination normal.   Psychiatric:         Behavior: Behavior normal.         Thought Content: Thought content normal.         Judgment: Judgment normal.         Lab Results   Component Value Date     05/21/2025     01/06/2025    K 4.0 05/21/2025    K 4.5 01/06/2025    CL 99 05/21/2025     01/06/2025    CO2 27 05/21/2025    CO2 26 01/06/2025    BUN 11 05/21/2025    CREATININE 0.8 05/21/2025    GLU 92 05/21/2025    GLU 80 01/06/2025    HGBA1C 5.2 05/21/2025    HGBA1C 5.2 01/06/2025    AST 16 05/21/2025    AST 27 01/06/2025    ALT 13 05/21/2025    ALT 20 01/06/2025    ALBUMIN 4.0 05/21/2025    ALBUMIN 4.1 01/06/2025    PROT 8.0 05/21/2025    PROT 7.7 01/06/2025    BILITOT 0.7 05/21/2025    BILITOT 0.4 01/06/2025    WBC 5.51 05/21/2025    HGB 15.5 05/21/2025    HGB 15.1 03/12/2025    HCT 49.8 05/21/2025    HCT 44.9 03/12/2025     (H) 05/21/2025    MCV 95 03/12/2025     05/21/2025     03/12/2025    TSH 1.035 05/21/2025    TSH 1.380 09/06/2024    CHOL 192 05/21/2025    CHOL 164 01/06/2025    HDL 54 05/21/2025    LDLCALC 116.2 05/21/2025    TRIG 109 05/21/2025    TRIG 63 01/06/2025         No results found for: "BNP", "INR"       Assessment:      1. History of heart attack    2. Primary hypertension    3. Mixed hyperlipidemia    4. Type 2 diabetes mellitus without complication, without long-term current use of insulin    5. BMI " 28.0-28.9,adult        Plan:     HTN  - titrate meds  - stable today    2. DM2 A1c 5.2  - cont tx - Jardiance and metformin    3. Overweight  - cont weight loss    4. H/O MI  - CA score ordered  - cont DAPT and statin - stop Plavix due to low iron - will see hemeonc     5. HLD  - cont statin      Thank you for allowing me to participate in this patient's care. Please do not hesitate to contact me with any questions or concerns. Consult note has been forwarded to the referral physician.          [1]   Social History  Tobacco Use    Smoking status: Never     Passive exposure: Never    Smokeless tobacco: Never   Substance Use Topics    Alcohol use: Yes     Alcohol/week: 6.0 standard drinks of alcohol     Types: 6 Cans of beer per week     Comment: social    Drug use: Never

## 2025-06-23 ENCOUNTER — RESULTS FOLLOW-UP (OUTPATIENT)
Dept: INTERNAL MEDICINE | Facility: CLINIC | Age: 50
End: 2025-06-23
Payer: COMMERCIAL

## 2025-06-23 NOTE — PROGRESS NOTES
Subjective     Patient ID: Weston Morgan is a 49 y.o. male.    Chief Complaint: Annual Exam, Sore Throat, and Nasal Congestion (Symptoms started Friday 5/16/25 . Pt thinks it's from his A/C home )    History of Present Illness    PRESENTATION:  Weston presents for annual exam and to discuss recent lab results and request additional labs for vitamin and mineral deficiencies.    HPI:  Weston reports a recent sore throat, which the provider suspects is related to allergies. He reports feeling improved, with increased energy and reduced stress compared to a previous visit. He expresses interest in obtaining a comprehensive blood panel to check for vitamin and mineral deficiencies. He notes changes in his blood sugar, reporting an increase from approximately 105-109 to about 130 after switching to a new type of coffee containing rice and mushroom components. He reports increased urinary frequency, noting that yesterday he urinated every 3.5 hours overnight, which is unusual for him. He attributes this to increased water intake due to a 36-hour fast he was undertaking. He requests a referral to a cardiologist for a check-up.    He denies chest pain, shortness of breath, bowel problems, leg swelling, and urinary symptoms such as hesitancy or incomplete bladder emptying.    MEDICAL HISTORY:  Weston has diabetes, leukopenia, and history of heart attackt.    FAMILY HISTORY:  Family history is significant for mother, who is 86 years old and in good health.    TEST RESULTS:  Weston underwent hematology labs a few months ago, with results looking good. His blood sugar have been recently around 130 with new coffee, whereas they were previously 109-105 with old coffee.    SOCIAL HISTORY:  Occupation: Employed      ROS:  General: -fever, -chills, -fatigue, -weight gain, -weight loss  Eyes: -vision changes, -redness, -discharge  ENT: +ear pain, -nasal congestion, +sore throat  Cardiovascular: -chest pain, -palpitations, -lower  extremity edema  Respiratory: -cough, -shortness of breath  Gastrointestinal: -abdominal pain, -nausea, -vomiting, -diarrhea, -constipation, -blood in stool  Genitourinary: -dysuria, -hematuria, -frequency, +excessive urination  Musculoskeletal: -joint pain, -muscle pain  Skin: -rash, -lesion  Neurological: -headache, -dizziness, -numbness, -tingling  Psychiatric: -anxiety, -depression, -sleep difficulty            Objective     Physical Exam  Vitals and nursing note reviewed.   Constitutional:       Appearance: Normal appearance. He is normal weight.   HENT:      Head: Normocephalic and atraumatic.      Right Ear: Tympanic membrane, ear canal and external ear normal.      Left Ear: Tympanic membrane, ear canal and external ear normal.      Nose: Nose normal.      Mouth/Throat:      Mouth: Mucous membranes are moist.      Pharynx: Oropharynx is clear.   Eyes:      Extraocular Movements: Extraocular movements intact.      Conjunctiva/sclera: Conjunctivae normal.   Cardiovascular:      Rate and Rhythm: Normal rate and regular rhythm.   Pulmonary:      Effort: Pulmonary effort is normal.      Breath sounds: Normal breath sounds.   Abdominal:      General: Abdomen is flat. Bowel sounds are normal.      Palpations: Abdomen is soft.   Musculoskeletal:         General: Normal range of motion.      Cervical back: Normal range of motion and neck supple.      Right lower leg: No edema.      Left lower leg: No edema.   Skin:     General: Skin is warm and dry.   Neurological:      General: No focal deficit present.      Mental Status: He is alert and oriented to person, place, and time.   Psychiatric:         Mood and Affect: Mood normal.         Behavior: Behavior normal.                Assessment and Plan     1. Routine general medical examination at a health care facility  Comments:  reviewed age appropriate screenings and immunizations  Orders:  -     T4, Free; Future; Expected date: 05/21/2025  -     TSH; Future; Expected  date: 05/21/2025    2. Upper respiratory tract infection, unspecified type  -     POCT Influenza A/B Molecular  -     POCT Rapid Strep A  -     POCT COVID-19 Rapid Screening    3. Leukopenia, unspecified type  -     Vitamin B1; Future; Expected date: 05/21/2025  -     Vitamin B6; Future; Expected date: 05/21/2025  -     Folate; Future; Expected date: 05/21/2025  -     Iron and TIBC; Future; Expected date: 05/21/2025  -     Sedimentation rate; Future; Expected date: 05/21/2025  -     ALBER Screen w/Reflex; Future; Expected date: 05/21/2025  -     Hemoglobin Electrophoresis Evaluation, Blood; Future; Expected date: 05/21/2025    4. History of heart attack  Overview:  stable cont rosuvaastatin and plavix    Orders:  -     Lipid Panel; Future; Expected date: 05/21/2025    5. Type 2 diabetes mellitus without complication, without long-term current use of insulin  Overview:  stable cont jardiance and metformin    Orders:  -     Hemoglobin A1C; Future; Expected date: 05/21/2025  -     Microalbumin/creatinine urine ratio    6. Primary hypertension  Overview:  stable cont diet cotnrol    Orders:  -     Comprehensive Metabolic Panel; Future; Expected date: 05/21/2025  -     CBC Auto Differential; Future; Expected date: 05/21/2025  -     Ambulatory referral/consult to Cardiology; Future; Expected date: 05/28/2025    7. Special screening for malignant neoplasm of prostate  -     PSA, Screening; Future; Expected date: 05/21/2025        Assessment & Plan    Assessed sore throat, likely due to allergies.  Evaluated recent hematology results, noting no concerning changes.  Considered request for comprehensive labs.  Reviewed glucose levels, noting recent elevation potentially related to change in coffee.  Assessed urinary frequency, likely due to increased fluid intake during fasting.    PATIENT EDUCATION:   Explained idiopathic nature of some blood conditions, emphasizing that sometimes there is no specific cause.   Discussed the  difference between colonoscopy, Cologuard kit, and stool card test for colon cancer screening.    ACTION ITEMS/LIFESTYLE:   Weston to schedule eye exam.   Weston to schedule dental follow-up (every 6 months).   Weston to decide on colon cancer screening method (colonoscopy or Cologuard kit) and proceed with screening this year.    ORDERS:   Ordered comprehensive lab panel including: Lipid panel, A1C, Thyroid function tests, ALBER panel, Iron studies, Vitamin B1 and B6 levels, PSA, Hemoglobin electrophoresis to screen for potential thalassemia.   Ordered strep test.   Recommend tetanus vaccine due to occupation and risk factors for preventing infection from cuts or injuries.    REFERRALS:   Referred to Dr. Liu for cardiology evaluation.    FOLLOW UP:   Follow up in 3 months to monitor borderline diabetic status.   Contact the office if deciding to proceed with Cologuard kit for colon cancer screening.              Follow up in about 3 months (around 8/21/2025).    This note was generated with the assistance of ambient listening technology. Verbal consent was obtained by the patient and accompanying visitor(s) for the recording of patient appointment to facilitate this note. I attest to having reviewed and edited the generated note for accuracy, though some syntax or spelling errors may persist. Please contact the author of this note for any clarification.

## 2025-06-27 RX ORDER — SILDENAFIL 50 MG/1
TABLET, FILM COATED ORAL
Qty: 10 TABLET | Refills: 1 | Status: SHIPPED | OUTPATIENT
Start: 2025-06-27

## 2025-08-19 ENCOUNTER — PATIENT MESSAGE (OUTPATIENT)
Dept: ADMINISTRATIVE | Facility: HOSPITAL | Age: 50
End: 2025-08-19
Payer: COMMERCIAL